# Patient Record
(demographics unavailable — no encounter records)

---

## 2017-12-19 NOTE — [facility]
________________________________________________________________________________

Patient Name: Ana Morales               Procedure Date: 12/19/2017 8:31 AM

MRN: [id number]                          Account Number: [id number]

YOB: 1960               Age: 57

Room: [facility]                            Gender: Female

Note Status: Finalized                 

________________________________________________________________________________

 

Procedure:           Upper GI endoscopy

Indications:         Follow-up of esophageal reflux

Providers:           Justin Honeycutt MD

Referring MD:        Noemy ZEPEDA DO

Requesting Provider: 

Medicines:           Monitored Anesthesia Care

Complications:       No immediate complications.

________________________________________________________________________________

Procedure:           Pre-Anesthesia Assessment:

                     - Prior to the procedure, a History and Physical was 

                     performed, and patient medications and allergies were 

                     reviewed. The patient is competent. The risks and 

                     benefits of the procedure and the sedation options and 

                     risks were discussed with the patient. All questions were 

                     answered and informed consent was obtained. Patient 

                     identification and proposed procedure were verified by 

                     the physician, the nurse and the anesthetist in the 

                     procedure room. Mental Status Examination: alert and 

                     oriented. Airway Examination: normal oropharyngeal airway 

                     and neck mobility. CV Examination: regular rate and 

                     rhythm. Prophylactic Antibiotics: The patient does not 

                     require prophylactic antibiotics. Prior Anticoagulants: 

                     The patient has taken no previous anticoagulant or 

                     antiplatelet agents. ASA Grade Assessment: II - A patient 

                     with mild systemic disease. After reviewing the risks and 

                     benefits, the patient was deemed in satisfactory 

                     condition to undergo the procedure. The anesthesia plan 

                     was to use monitored anesthesia care (MAC). Immediately 

                     prior to administration of medications, the patient was 

                     re-assessed for adequacy to receive sedatives. The heart 

                     rate, respiratory rate, oxygen saturations, blood 

                     pressure, adequacy of pulmonary ventilation, and response 

                     to care were monitored throughout the procedure. The 

                     physical status of the patient was re-assessed after the 

                     procedure.

                     The Endoscope was introduced through the mouth, and 

                     advanced to the second part of duodenum. The upper GI 

                     endoscopy was accomplished without difficulty. The 

                     patient tolerated the procedure well.

                                                                                

Findings:

     The examined esophagus was normal.

     The Z-line was regular and was found 38 cm from the incisors.

     Multiple 3 to 10 mm pedunculated and sessile polyps with no bleeding and 

     no stigmata of recent bleeding were found in the gastric fundus and on 

     the greater curvature of the stomach. Biopsies were taken with a cold 

     forceps for histology. Estimated blood loss was minimal.

     The examined duodenum was normal.

                                                                                

Impression:          - Normal esophagus.

                     - Z-line regular, 38 cm from the incisors.

                     - Multiple gastric polyps. Biopsied.

                     - Normal examined duodenum.

Recommendation:      - Discharge patient to home.

                     - Resume previous diet.

                     - Continue present medications.

                     - Await pathology results.

                     - Telephone endoscopist for pathology results in 1 week.

                                                                                

 

__________________

Justin Honeycutt MD

12/19/2017 9:12:41 AM

Number of Addenda: 0

 

Note Initiated On: 12/19/2017 8:31 AM

Estimated Blood Loss:

     Estimated blood loss was minimal.

## 2017-12-19 NOTE — [facility]
________________________________________________________________________________

Patient Name: Ana Morales               Procedure Date: 12/19/2017 8:32 AM

MRN: [id number]                          Account Number: [id number]

YOB: 1960               Age: 57

Room: [facility]                            Gender: Female

Note Status: Finalized                 

________________________________________________________________________________

 

Procedure:           Colonoscopy

Indications:         Screening in patient at increased risk: Colorectal cancer 

                     in father before age 60

Providers:           Justin Honeycutt MD

Referring MD:        Noemy ZEPEDA DO

Requesting Provider: 

Medicines:           Monitored Anesthesia Care

Complications:       No immediate complications.

________________________________________________________________________________

Procedure:           Pre-Anesthesia Assessment:

                     - Prior to the procedure, a History and Physical was 

                     performed, and patient medications and allergies were 

                     reviewed. The patient is competent. The risks and 

                     benefits of the procedure and the sedation options and 

                     risks were discussed with the patient. All questions were 

                     answered and informed consent was obtained. Patient 

                     identification and proposed procedure were verified by 

                     the physician, the nurse and the anesthetist in the 

                     procedure room. Mental Status Examination: alert and 

                     oriented. Airway Examination: normal oropharyngeal airway 

                     and neck mobility. CV Examination: regular rate and 

                     rhythm. Prophylactic Antibiotics: The patient does not 

                     require prophylactic antibiotics. Prior Anticoagulants: 

                     The patient has taken no previous anticoagulant or 

                     antiplatelet agents. ASA Grade Assessment: II - A patient 

                     with mild systemic disease. After reviewing the risks and 

                     benefits, the patient was deemed in satisfactory 

                     condition to undergo the procedure. The anesthesia plan 

                     was to use monitored anesthesia care (MAC). Immediately 

                     prior to administration of medications, the patient was 

                     re-assessed for adequacy to receive sedatives. The heart 

                     rate, respiratory rate, oxygen saturations, blood 

                     pressure, adequacy of pulmonary ventilation, and response 

                     to care were monitored throughout the procedure. The 

                     physical status of the patient was re-assessed after the 

                     procedure.

                     The Colonoscope was introduced through the anus and 

                     advanced to the cecum, identified by appendiceal orifice 

                     and ileocecal valve. The colonoscopy was performed 

                     without difficulty. The patient tolerated the procedure 

                     well. The quality of the bowel preparation was excellent.

                                                                                

Findings:

     Skin tags were found on perianal exam.

     The colon (entire examined portion) appeared normal.

                                                                                

Impression:          - Perianal skin tags found on perianal exam.

                     - The entire examined colon is normal.

                     - No specimens collected.

Recommendation:      - Discharge patient to home.

                     - Resume previous diet.

                     - Continue present medications.

                     - Repeat colonoscopy in 5-10 years for screening purposes.

                                                                                

 

__________________

Justin Honeycutt MD

12/19/2017 9:16:32 AM

Number of Addenda: 0

 

Note Initiated On: 12/19/2017 8:32 AM

Estimated Blood Loss:

     Estimated blood loss: none.